# Patient Record
(demographics unavailable — no encounter records)

---

## 2025-03-13 NOTE — HISTORY OF PRESENT ILLNESS
[Patient reported PAP Smear was normal] : Patient reported PAP Smear was normal [Y] : Patient is sexually active [N] : Patient denies prior pregnancies [Regular Cycle Intervals] : periods have been regular [Frequency: Q ___ days] : menstrual periods occur approximately every [unfilled] days [Menarche Age: ____] : age at menarche was [unfilled] [Currently Active] : currently active [Men] : men [No] : No [Patient refuses STI testing] : Patient refuses STI testing [PapSmeardate] : 2024 [LMPDate] : 02/03/25 [MensesFreq] : 30 [MensesLength] : 3-4 [FreeTextEntry1] : 02/03/25

## 2025-03-13 NOTE — PHYSICAL EXAM
[Chaperone Present] : A chaperone was present in the examining room during all aspects of the physical examination [Appropriately responsive] : appropriately responsive [Alert] : alert [No Acute Distress] : no acute distress [Soft] : soft [Non-tender] : non-tender [Non-distended] : non-distended [Oriented x3] : oriented x3 [Examination Of The Breasts] : a normal appearance [No Masses] : no breast masses were palpable [Labia Majora] : normal [Labia Minora] : normal [Normal] : normal [Uterine Adnexae] : normal [FreeTextEntry2] : Omaira Salas [FreeTextEntry4] : Clinically well perfused [FreeTextEntry5] : No increased work of breathing

## 2025-03-13 NOTE — PLAN
[FreeTextEntry1] : Assessment/Plan: 30 yo G0 presenting for GYN annual visit, also with concerns re possible pregnancy, pelvic pain, and acne.   #Amenorrhea in setting of recent unprotected sex - hcg today - Rec discontinue retinoid if becomes pregnant or TTC - Rec prenatal vitamin   #L sided pelvic pain - TVUS  #Acne - Cause unclear  - If not pregnant and if signs of PCOS on US will collect PCOS labs next visit   #Contraception counseling - Contraception options reviewed including LARC (IUD and Nexplanon), DMPA, combined hormonal contraception. Discussed pt has no contraindications. - Pt unsure of decision, given info for [email or web address] - Pt to notify practice once she makes decision - Rec condoms for STI prevention   #HCM -Pt to send pap record -STI testing declined -A1c/thyroid/lipid panel -Discussed exercise/diet -Breast self awareness reviewed   Follow up 2-3 weeks to review imaging